# Patient Record
Sex: MALE | Race: WHITE | Employment: FULL TIME | URBAN - METROPOLITAN AREA
[De-identification: names, ages, dates, MRNs, and addresses within clinical notes are randomized per-mention and may not be internally consistent; named-entity substitution may affect disease eponyms.]

---

## 2021-01-01 ENCOUNTER — APPOINTMENT (OUTPATIENT)
Dept: CT IMAGING | Age: 56
End: 2021-01-01
Attending: EMERGENCY MEDICINE
Payer: COMMERCIAL

## 2021-01-01 ENCOUNTER — HOSPITAL ENCOUNTER (EMERGENCY)
Age: 56
End: 2021-07-24
Attending: EMERGENCY MEDICINE | Admitting: EMERGENCY MEDICINE
Payer: COMMERCIAL

## 2021-01-01 VITALS
OXYGEN SATURATION: 79 % | SYSTOLIC BLOOD PRESSURE: 147 MMHG | DIASTOLIC BLOOD PRESSURE: 120 MMHG | WEIGHT: 250.88 LBS | HEART RATE: 59 BPM

## 2021-01-01 DIAGNOSIS — R57.0 CARDIOGENIC SHOCK (HCC): ICD-10-CM

## 2021-01-01 DIAGNOSIS — I46.9 PEA (PULSELESS ELECTRICAL ACTIVITY) (HCC): ICD-10-CM

## 2021-01-01 DIAGNOSIS — I46.9 CARDIAC ARREST (HCC): Primary | ICD-10-CM

## 2021-01-01 DIAGNOSIS — I21.4 NSTEMI (NON-ST ELEVATED MYOCARDIAL INFARCTION) (HCC): ICD-10-CM

## 2021-01-01 LAB
ALBUMIN SERPL-MCNC: 2.4 G/DL (ref 3.5–5)
ALBUMIN/GLOB SERPL: 0.9 {RATIO} (ref 1.1–2.2)
ALP SERPL-CCNC: 94 U/L (ref 45–117)
ALT SERPL-CCNC: 319 U/L (ref 12–78)
ANION GAP SERPL CALC-SCNC: 18 MMOL/L (ref 5–15)
AST SERPL-CCNC: 298 U/L (ref 15–37)
ATRIAL RATE: 166 BPM
ATRIAL RATE: 68 BPM
BASOPHILS # BLD: 0.1 K/UL (ref 0–0.1)
BASOPHILS NFR BLD: 1 % (ref 0–1)
BILIRUB SERPL-MCNC: 0.7 MG/DL (ref 0.2–1)
BUN SERPL-MCNC: 14 MG/DL (ref 6–20)
BUN/CREAT SERPL: 11 (ref 12–20)
CALCIUM SERPL-MCNC: 7.3 MG/DL (ref 8.5–10.1)
CALCULATED P AXIS, ECG09: 78 DEGREES
CALCULATED R AXIS, ECG10: -82 DEGREES
CALCULATED R AXIS, ECG10: 145 DEGREES
CALCULATED T AXIS, ECG11: -57 DEGREES
CALCULATED T AXIS, ECG11: 26 DEGREES
CHLORIDE SERPL-SCNC: 106 MMOL/L (ref 97–108)
CO2 SERPL-SCNC: 24 MMOL/L (ref 21–32)
COMMENT, HOLDF: NORMAL
CREAT SERPL-MCNC: 1.31 MG/DL (ref 0.7–1.3)
DIAGNOSIS, 93000: NORMAL
DIAGNOSIS, 93000: NORMAL
DIFFERENTIAL METHOD BLD: ABNORMAL
EOSINOPHIL # BLD: 0 K/UL (ref 0–0.4)
EOSINOPHIL NFR BLD: 0 % (ref 0–7)
ERYTHROCYTE [DISTWIDTH] IN BLOOD BY AUTOMATED COUNT: 12.4 % (ref 11.5–14.5)
GLOBULIN SER CALC-MCNC: 2.6 G/DL (ref 2–4)
GLUCOSE BLD STRIP.AUTO-MCNC: 272 MG/DL (ref 65–117)
GLUCOSE SERPL-MCNC: 349 MG/DL (ref 65–100)
HCT VFR BLD AUTO: 52.3 % (ref 36.6–50.3)
HGB BLD-MCNC: 16.5 G/DL (ref 12.1–17)
IMM GRANULOCYTES # BLD AUTO: 0 K/UL (ref 0–0.04)
IMM GRANULOCYTES NFR BLD AUTO: 0 % (ref 0–0.5)
LYMPHOCYTES # BLD: 6.8 K/UL (ref 0.8–3.5)
LYMPHOCYTES NFR BLD: 70 % (ref 12–49)
MCH RBC QN AUTO: 32.2 PG (ref 26–34)
MCHC RBC AUTO-ENTMCNC: 31.5 G/DL (ref 30–36.5)
MCV RBC AUTO: 101.9 FL (ref 80–99)
MONOCYTES # BLD: 0.8 K/UL (ref 0–1)
MONOCYTES NFR BLD: 8 % (ref 5–13)
NEUTS BAND NFR BLD MANUAL: 2 %
NEUTS SEG # BLD: 2 K/UL (ref 1.8–8)
NEUTS SEG NFR BLD: 19 % (ref 32–75)
NRBC # BLD: 0.06 K/UL (ref 0–0.01)
NRBC BLD-RTO: 0.6 PER 100 WBC
P-R INTERVAL, ECG05: 130 MS
PLATELET # BLD AUTO: 85 K/UL (ref 150–400)
PMV BLD AUTO: 11.6 FL (ref 8.9–12.9)
POTASSIUM SERPL-SCNC: 3.5 MMOL/L (ref 3.5–5.1)
PROT SERPL-MCNC: 5 G/DL (ref 6.4–8.2)
Q-T INTERVAL, ECG07: 388 MS
Q-T INTERVAL, ECG07: 438 MS
QRS DURATION, ECG06: 134 MS
QRS DURATION, ECG06: 180 MS
QTC CALCULATION (BEZET), ECG08: 440 MS
QTC CALCULATION (BEZET), ECG08: 503 MS
RBC # BLD AUTO: 5.13 M/UL (ref 4.1–5.7)
RBC MORPH BLD: ABNORMAL
SAMPLES BEING HELD,HOLD: NORMAL
SERVICE CMNT-IMP: ABNORMAL
SODIUM SERPL-SCNC: 148 MMOL/L (ref 136–145)
TROPONIN I SERPL-MCNC: 0.15 NG/ML
VENTRICULAR RATE, ECG03: 101 BPM
VENTRICULAR RATE, ECG03: 61 BPM
WBC # BLD AUTO: 9.7 K/UL (ref 4.1–11.1)

## 2021-01-01 PROCEDURE — 2709999900 HC NON-CHARGEABLE SUPPLY

## 2021-01-01 PROCEDURE — 96376 TX/PRO/DX INJ SAME DRUG ADON: CPT

## 2021-01-01 PROCEDURE — 80053 COMPREHEN METABOLIC PANEL: CPT

## 2021-01-01 PROCEDURE — 96374 THER/PROPH/DIAG INJ IV PUSH: CPT

## 2021-01-01 PROCEDURE — 77030015766: Performed by: INTERNAL MEDICINE

## 2021-01-01 PROCEDURE — 99292 CRITICAL CARE ADDL 30 MIN: CPT | Performed by: INTERNAL MEDICINE

## 2021-01-01 PROCEDURE — 31500 INSERT EMERGENCY AIRWAY: CPT

## 2021-01-01 PROCEDURE — 77030029641 HC PMP CARD PERC IMPELLA CP ABIM -L: Performed by: INTERNAL MEDICINE

## 2021-01-01 PROCEDURE — C1894 INTRO/SHEATH, NON-LASER: HCPCS | Performed by: INTERNAL MEDICINE

## 2021-01-01 PROCEDURE — 77030029488 HC ELECTRD PAD DEFIB ZOLL -B

## 2021-01-01 PROCEDURE — 93005 ELECTROCARDIOGRAM TRACING: CPT

## 2021-01-01 PROCEDURE — C1769 GUIDE WIRE: HCPCS | Performed by: INTERNAL MEDICINE

## 2021-01-01 PROCEDURE — 77030005513 HC CATH URETH FOL11 MDII -B

## 2021-01-01 PROCEDURE — 77030019569 HC BND COMPR RAD TERU -B: Performed by: INTERNAL MEDICINE

## 2021-01-01 PROCEDURE — 92950 HEART/LUNG RESUSCITATION CPR: CPT

## 2021-01-01 PROCEDURE — 84484 ASSAY OF TROPONIN QUANT: CPT

## 2021-01-01 PROCEDURE — 77030008683 HC TU ET CUF COVD -A

## 2021-01-01 PROCEDURE — 96375 TX/PRO/DX INJ NEW DRUG ADDON: CPT

## 2021-01-01 PROCEDURE — 99291 CRITICAL CARE FIRST HOUR: CPT | Performed by: INTERNAL MEDICINE

## 2021-01-01 PROCEDURE — 77030002996 HC SUT SLK J&J -A: Performed by: INTERNAL MEDICINE

## 2021-01-01 PROCEDURE — 93452 LEFT HRT CATH W/VENTRCLGRPHY: CPT | Performed by: INTERNAL MEDICINE

## 2021-01-01 PROCEDURE — 74011000250 HC RX REV CODE- 250: Performed by: INTERNAL MEDICINE

## 2021-01-01 PROCEDURE — 77030019698 HC SYR ANGI MDLON MRTM -A: Performed by: INTERNAL MEDICINE

## 2021-01-01 PROCEDURE — 33999 UNLISTED PX CARDIAC SURGERY: CPT | Performed by: INTERNAL MEDICINE

## 2021-01-01 PROCEDURE — 99153 MOD SED SAME PHYS/QHP EA: CPT | Performed by: INTERNAL MEDICINE

## 2021-01-01 PROCEDURE — 99152 MOD SED SAME PHYS/QHP 5/>YRS: CPT | Performed by: INTERNAL MEDICINE

## 2021-01-01 PROCEDURE — C1887 CATHETER, GUIDING: HCPCS | Performed by: INTERNAL MEDICINE

## 2021-01-01 PROCEDURE — 51702 INSERT TEMP BLADDER CATH: CPT

## 2021-01-01 PROCEDURE — 84295 ASSAY OF SERUM SODIUM: CPT

## 2021-01-01 PROCEDURE — 77030008543 HC TBNG MON PRSS MRTM -A: Performed by: INTERNAL MEDICINE

## 2021-01-01 PROCEDURE — 77030004549 HC CATH ANGI DX PRF MRTM -A: Performed by: INTERNAL MEDICINE

## 2021-01-01 PROCEDURE — 96365 THER/PROPH/DIAG IV INF INIT: CPT

## 2021-01-01 PROCEDURE — 71275 CT ANGIOGRAPHY CHEST: CPT

## 2021-01-01 PROCEDURE — 74011250636 HC RX REV CODE- 250/636: Performed by: EMERGENCY MEDICINE

## 2021-01-01 PROCEDURE — 74011000250 HC RX REV CODE- 250: Performed by: EMERGENCY MEDICINE

## 2021-01-01 PROCEDURE — 36556 INSERT NON-TUNNEL CV CATH: CPT | Performed by: INTERNAL MEDICINE

## 2021-01-01 PROCEDURE — 36415 COLL VENOUS BLD VENIPUNCTURE: CPT

## 2021-01-01 PROCEDURE — 94002 VENT MGMT INPAT INIT DAY: CPT

## 2021-01-01 PROCEDURE — 77030010221 HC SPLNT WR POS TELE -B: Performed by: INTERNAL MEDICINE

## 2021-01-01 PROCEDURE — 82962 GLUCOSE BLOOD TEST: CPT

## 2021-01-01 PROCEDURE — 99284 EMERGENCY DEPT VISIT MOD MDM: CPT | Performed by: INTERNAL MEDICINE

## 2021-01-01 PROCEDURE — 99291 CRITICAL CARE FIRST HOUR: CPT

## 2021-01-01 PROCEDURE — 77030008771 HC TU NG SALEM SUMP -A

## 2021-01-01 PROCEDURE — 74011000636 HC RX REV CODE- 636: Performed by: EMERGENCY MEDICINE

## 2021-01-01 PROCEDURE — 85025 COMPLETE CBC W/AUTO DIFF WBC: CPT

## 2021-01-01 PROCEDURE — 77010033678 HC OXYGEN DAILY

## 2021-01-01 PROCEDURE — 74011250636 HC RX REV CODE- 250/636: Performed by: INTERNAL MEDICINE

## 2021-01-01 PROCEDURE — 77030019697 HC SYR ANGI INFL MRTM -B: Performed by: INTERNAL MEDICINE

## 2021-01-01 RX ORDER — SODIUM BICARBONATE 1 MEQ/ML
SYRINGE (ML) INTRAVENOUS
Status: COMPLETED | OUTPATIENT
Start: 2021-01-01 | End: 2021-01-01

## 2021-01-01 RX ORDER — EPINEPHRINE 0.1 MG/ML
INJECTION INTRACARDIAC; INTRAVENOUS
Status: COMPLETED | OUTPATIENT
Start: 2021-01-01 | End: 2021-01-01

## 2021-01-01 RX ORDER — LIDOCAINE HCL/PF 100 MG/5ML
SYRINGE (ML) INTRAVENOUS
Status: COMPLETED | OUTPATIENT
Start: 2021-01-01 | End: 2021-01-01

## 2021-01-01 RX ORDER — CALCIUM GLUCONATE 94 MG/ML
INJECTION, SOLUTION INTRAVENOUS AS NEEDED
Status: DISCONTINUED | OUTPATIENT
Start: 2021-01-01 | End: 2021-01-01 | Stop reason: HOSPADM

## 2021-01-01 RX ORDER — LIDOCAINE HYDROCHLORIDE 10 MG/ML
INJECTION INFILTRATION; PERINEURAL AS NEEDED
Status: DISCONTINUED | OUTPATIENT
Start: 2021-01-01 | End: 2021-01-01 | Stop reason: HOSPADM

## 2021-01-01 RX ORDER — NOREPINEPHRINE BITARTRATE/D5W 8 MG/250ML
PLASTIC BAG, INJECTION (ML) INTRAVENOUS
Status: COMPLETED | OUTPATIENT
Start: 2021-01-01 | End: 2021-01-01

## 2021-01-01 RX ORDER — PHENYLEPHRINE HCL IN 0.9% NACL 0.4MG/10ML
SYRINGE (ML) INTRAVENOUS
Status: DISCONTINUED
Start: 2021-01-01 | End: 2021-01-01 | Stop reason: HOSPADM

## 2021-01-01 RX ORDER — HEPARIN SODIUM 5000 [USP'U]/ML
INJECTION, SOLUTION INTRAVENOUS; SUBCUTANEOUS
Status: COMPLETED | OUTPATIENT
Start: 2021-01-01 | End: 2021-01-01

## 2021-01-01 RX ORDER — EPINEPHRINE 1 MG/ML
INJECTION, SOLUTION, CONCENTRATE INTRAVENOUS
Status: DISCONTINUED
Start: 2021-01-01 | End: 2021-01-01 | Stop reason: HOSPADM

## 2021-01-01 RX ORDER — PHENYLEPHRINE HYDROCHLORIDE 10 MG/ML
INJECTION INTRAVENOUS AS NEEDED
Status: DISCONTINUED | OUTPATIENT
Start: 2021-01-01 | End: 2021-01-01 | Stop reason: HOSPADM

## 2021-01-01 RX ORDER — CALCIUM CHLORIDE INJECTION 100 MG/ML
INJECTION, SOLUTION INTRAVENOUS
Status: COMPLETED | OUTPATIENT
Start: 2021-01-01 | End: 2021-01-01

## 2021-01-01 RX ORDER — ATROPINE SULFATE 0.1 MG/ML
INJECTION INTRAVENOUS
Status: COMPLETED | OUTPATIENT
Start: 2021-01-01 | End: 2021-01-01

## 2021-01-01 RX ORDER — AMIODARONE HYDROCHLORIDE 150 MG/3ML
INJECTION, SOLUTION INTRAVENOUS
Status: COMPLETED | OUTPATIENT
Start: 2021-01-01 | End: 2021-01-01

## 2021-01-01 RX ORDER — DIPHENHYDRAMINE HYDROCHLORIDE 50 MG/ML
50 INJECTION, SOLUTION INTRAMUSCULAR; INTRAVENOUS
Status: DISCONTINUED | OUTPATIENT
Start: 2021-01-01 | End: 2021-01-01

## 2021-01-01 RX ORDER — ASPIRIN 300 MG/1
300 SUPPOSITORY RECTAL
Status: DISCONTINUED | OUTPATIENT
Start: 2021-01-01 | End: 2021-01-01 | Stop reason: HOSPADM

## 2021-01-01 RX ADMIN — EPINEPHRINE 1 MG: 0.1 INJECTION, SOLUTION ENDOTRACHEAL; INTRACARDIAC; INTRAVENOUS at 01:18

## 2021-01-01 RX ADMIN — IOPAMIDOL 100 ML: 755 INJECTION, SOLUTION INTRAVENOUS at 01:44

## 2021-01-01 RX ADMIN — CALCIUM CHLORIDE 1 G: 100 INJECTION, SOLUTION INTRAVENOUS at 01:28

## 2021-01-01 RX ADMIN — SODIUM CHLORIDE, POTASSIUM CHLORIDE, SODIUM LACTATE AND CALCIUM CHLORIDE 1000 ML: 600; 310; 30; 20 INJECTION, SOLUTION INTRAVENOUS at 01:31

## 2021-01-01 RX ADMIN — EPINEPHRINE 1 MG: 0.1 INJECTION, SOLUTION ENDOTRACHEAL; INTRACARDIAC; INTRAVENOUS at 02:07

## 2021-01-01 RX ADMIN — HEPARIN SODIUM 4000 UNITS: 5000 INJECTION, SOLUTION INTRAVENOUS; SUBCUTANEOUS at 02:21

## 2021-01-01 RX ADMIN — EPINEPHRINE 1 MG: 0.1 INJECTION, SOLUTION ENDOTRACHEAL; INTRACARDIAC; INTRAVENOUS at 02:10

## 2021-01-01 RX ADMIN — SODIUM BICARBONATE 50 MEQ: 84 INJECTION, SOLUTION INTRAVENOUS at 01:57

## 2021-01-01 RX ADMIN — AMIODARONE HYDROCHLORIDE 300 MG: 50 INJECTION, SOLUTION INTRAVENOUS at 01:24

## 2021-01-01 RX ADMIN — ATROPINE SULFATE 1 MG: 0.1 INJECTION, SOLUTION ENDOTRACHEAL; INTRAMUSCULAR; INTRAVENOUS; SUBCUTANEOUS at 01:52

## 2021-01-01 RX ADMIN — EPINEPHRINE 1 MG: 0.1 INJECTION, SOLUTION ENDOTRACHEAL; INTRACARDIAC; INTRAVENOUS at 01:33

## 2021-01-01 RX ADMIN — CALCIUM CHLORIDE 1 G: 100 INJECTION, SOLUTION INTRAVENOUS at 01:58

## 2021-01-01 RX ADMIN — EPINEPHRINE 1 MG: 0.1 INJECTION, SOLUTION ENDOTRACHEAL; INTRACARDIAC; INTRAVENOUS at 01:31

## 2021-01-01 RX ADMIN — SODIUM BICARBONATE 50 MEQ: 84 INJECTION, SOLUTION INTRAVENOUS at 01:18

## 2021-01-01 RX ADMIN — EPINEPHRINE 1 MG: 0.1 INJECTION, SOLUTION ENDOTRACHEAL; INTRACARDIAC; INTRAVENOUS at 01:21

## 2021-01-01 RX ADMIN — EPINEPHRINE 1 MG: 0.1 INJECTION, SOLUTION ENDOTRACHEAL; INTRACARDIAC; INTRAVENOUS at 01:51

## 2021-01-01 RX ADMIN — EPINEPHRINE 1 MG: 0.1 INJECTION, SOLUTION ENDOTRACHEAL; INTRACARDIAC; INTRAVENOUS at 01:27

## 2021-01-01 RX ADMIN — EPINEPHRINE 1 MG: 0.1 INJECTION, SOLUTION ENDOTRACHEAL; INTRACARDIAC; INTRAVENOUS at 01:17

## 2021-01-01 RX ADMIN — EPINEPHRINE 2 MCG/MIN: 1 INJECTION INTRAMUSCULAR; INTRAVENOUS; SUBCUTANEOUS at 01:34

## 2021-01-01 RX ADMIN — LIDOCAINE HYDROCHLORIDE 100 MG: 20 INJECTION, SOLUTION INTRAVENOUS at 01:27

## 2021-01-01 RX ADMIN — SODIUM BICARBONATE 50 MEQ: 84 INJECTION, SOLUTION INTRAVENOUS at 01:28

## 2021-01-01 RX ADMIN — EPINEPHRINE 1 MG: 0.1 INJECTION, SOLUTION ENDOTRACHEAL; INTRACARDIAC; INTRAVENOUS at 01:23

## 2021-01-01 RX ADMIN — EPINEPHRINE 1 MG: 0.1 INJECTION, SOLUTION ENDOTRACHEAL; INTRACARDIAC; INTRAVENOUS at 02:23

## 2021-07-24 PROBLEM — I46.9 CARDIAC ARREST (HCC): Status: ACTIVE | Noted: 2021-01-01

## 2021-07-24 PROBLEM — R57.0 CARDIOGENIC SHOCK (HCC): Status: ACTIVE | Noted: 2021-01-01

## 2021-07-24 PROBLEM — I46.9 PEA (PULSELESS ELECTRICAL ACTIVITY) (HCC): Status: ACTIVE | Noted: 2021-01-01

## 2021-07-24 NOTE — PROGRESS NOTES
responded to death notification of MR. Isabel George in 2552. Family was gathered in the room, was in debrief with doctor. After the doctor's meeting,  provided bereavement ministry to them with grief support and affirming their family support for Mr. Isabel George. They remained in the room, shared each other about the patients,  gave them time and space for grief process. Advised of  availability.      [id number] [facility] Bipin Mendez., M.S., Th.M.  Spiritual Care Provider   Paging Service [phone])

## 2021-07-24 NOTE — ED NOTES
0115:Patient arrives via EMS for cardiac arrest, CPR ongoing  0120: Shock performed per Erik Young MD  0123: Shock performed per Erik Young MD  0553: Shock performed per Erik Young MD  7793: pulse obtained  0143: Patient to CT  0158: Patient back to ED 20  NG tube (0201) & Doan (8217) placed by Fallon Romano with assistance from Maegan Blake (tech)  6693:  Laura Maki MD at bedside  0228: Patient to Cath lab

## 2021-07-24 NOTE — ACP (ADVANCE CARE PLANNING)
48 y/o M pt presented to [id number] [place] [address] after witnessed OSH cardiac arrest. Over the next 1.5 hours pt had multiple cardiac arrests with brief periods of ROSC. [address] conversation held with patients family, including two brothers Rey Dorsey and Heriberto Thorpe. I informed them that pt had had multiple cardiac arrests since he arrived 1-2 hours prior, and he at the time was getting CPR. Explained that given patient's prolonged down time even if we were to get a pulse back again, chance of meaningful recovery and return to cognitive baseline is unlikely. Pt's family stated that we should stop CPR and no longer try to resusitate him. I informed cath lab staff of families decision and CPR was stopped.     Miquel Gomez NP  [facility] critical care  7/23/21 4:10am

## 2021-07-24 NOTE — ED PROVIDER NOTES
EMERGENCY DEPARTMENT HISTORY AND PHYSICAL EXAM     ------------------------------------------------------------------------------------------------------  Please note that this dictation was completed with [employer], the [employer] voice recognition software. Quite often unanticipated grammatical, syntax, homophones, and other interpretive errors are inadvertently transcribed by the computer software. Please disregard these errors. Please excuse any errors that have escaped final proofreading.  -----------------------------------------------------------------------------------------------------------------    Date: 7/24/2021  Patient Name: Mandi Bauman    History of Presenting Illness     Chief Complaint   Patient presents with    Cardiac arrest       History Provided By: EMS    HPI: Mandi Bauman is a 49 y.o. male, without known significant pmhx (although family noted significant family history of coronary disease with multiple family members having CABGs), who presents via EMS to the ED with report of witnessed arrest by patient's teenager son who called 48 and initiated bystander CPR. EMS notes that on their arrival patient was in cardiac arrest.  CPR was initiated and apnea was given. Patient had eye gel placed by EMS and was ventilated. EMS notes several rounds of epinephrine through left lower extremity IO and reactive fibrillation for V. fib/V. tach arrest with intermittent PEA. On arrival patient is cyanotic from nipple line up, injected sclera bilaterally and frothy sputum coming from igel tube. We assumed care of patient in treatment room and continue with ACLS protocol, placing patient on [facility] device for CPR, reinitiation of epinephrine infusions.     PCP: Unknown, Provider, MD      Not on File      Current Facility-Administered Medications   Medication Dose Route Frequency Provider Last Rate Last Admin    EPINEPHrine HCl (PF) (ADRENALIN) 1 mg/mL (1 mL) injection             EPINEPHrine (ADRENALIN) 5 mg in 0.9% sodium chloride 250 mL infusion  0-10 mcg/min IntraVENous TITRATE Karena Quach MD 60 mL/hr at 07/24/21 0318 20 mcg/min at 07/24/21 0318    PHENYLephrine (NEOSYNEPHRINE) 0.4 mg/10 mL (40 mcg/mL) in NS syringe             aspirin (ASA) suppository 300 mg  300 mg Rectal NOW Karena Quach MD           Past History     Past Medical History:  No past medical history on file. Past Surgical History:  No past surgical history on file. Family History:  No family history on file. Social History:  Social History     Tobacco Use    Smoking status: Not on file   Substance Use Topics    Alcohol use: Not on file    Drug use: Not on file       Allergies:  Not on File      Review of Systems   Review of Systems   Unable to perform ROS: Acuity of condition       Physical Exam   Physical Exam  Vitals and nursing note reviewed. Constitutional:       Appearance: He is ill-appearing. Interventions: He is intubated. HENT:      Mouth/Throat:      Mouth: Mucous membranes are moist.      Comments: Copious amounts of bloody frothy sputum noted on intubation  Eyes:      Comments: 2+4 mm, fixed and dilated   Cardiovascular:      Pulses:           Carotid pulses are 0 on the right side. Femoral pulses are 0 on the right side. Pulmonary:      Effort: He is intubated. Comments: Agonal respirations  Abdominal:      General: There is distension. Palpations: Abdomen is soft. Genitourinary:     Penis: Normal.    Musculoskeletal:      Cervical back: Neck supple. Skin:     Capillary Refill: Capillary refill takes more than 3 seconds. Coloration: Skin is cyanotic (Cyanotic from chest to face). Neurological:      Mental Status: He is unresponsive. GCS: GCS eye subscore is 1. GCS verbal subscore is 1. GCS motor subscore is 1.            Diagnostic Study Results     Labs -     Recent Results (from the past 12 hour(s))   GLUCOSE, POC    Collection Time: 07/24/21  1:19 AM   Result Value Ref Range    Glucose (POC) 272 (H) 65 - 117 mg/dL    Performed by Molly DAVID)    CBC WITH AUTOMATED DIFF    Collection Time: 07/24/21  1:28 AM   Result Value Ref Range    WBC 9.7 4.1 - 11.1 K/uL    RBC 5.13 4.10 - 5.70 M/uL    HGB 16.5 12.1 - 17.0 g/dL    HCT 52.3 (H) 36.6 - 50.3 %    .9 (H) 80.0 - 99.0 FL    MCH 32.2 26.0 - 34.0 PG    MCHC 31.5 30.0 - 36.5 g/dL    RDW 12.4 11.5 - 14.5 %    PLATELET 85 (L) 854 - 400 K/uL    MPV 11.6 8.9 - 12.9 FL    NRBC 0.6 (H) 0  WBC    ABSOLUTE NRBC 0.06 (H) 0.00 - 0.01 K/uL    NEUTROPHILS 19 (L) 32 - 75 %    BAND NEUTROPHILS 2 %    LYMPHOCYTES 70 (H) 12 - 49 %    MONOCYTES 8 5 - 13 %    EOSINOPHILS 0 0 - 7 %    BASOPHILS 1 0 - 1 %    IMMATURE GRANULOCYTES 0 0.0 - 0.5 %    ABS. NEUTROPHILS 2.0 1.8 - 8.0 K/UL    ABS. LYMPHOCYTES 6.8 (H) 0.8 - 3.5 K/UL    ABS. MONOCYTES 0.8 0.0 - 1.0 K/UL    ABS. EOSINOPHILS 0.0 0.0 - 0.4 K/UL    ABS. BASOPHILS 0.1 0.0 - 0.1 K/UL    ABS. IMM. GRANS. 0.0 0.00 - 0.04 K/UL    DF AUTOMATED      RBC COMMENTS MACROCYTOSIS  1+       TROPONIN I    Collection Time: 07/24/21  1:28 AM   Result Value Ref Range    Troponin-I, Qt. 0.15 (H) <8.25 ng/mL   METABOLIC PANEL, COMPREHENSIVE    Collection Time: 07/24/21  1:28 AM   Result Value Ref Range    Sodium 148 (H) 136 - 145 mmol/L    Potassium 3.5 3.5 - 5.1 mmol/L    Chloride 106 97 - 108 mmol/L    CO2 24 21 - 32 mmol/L    Anion gap 18 (H) 5 - 15 mmol/L    Glucose 349 (H) 65 - 100 mg/dL    BUN 14 6 - 20 MG/DL    Creatinine 1.31 (H) 0.70 - 1.30 MG/DL    BUN/Creatinine ratio 11 (L) 12 - 20      GFR est AA >60 >60 ml/min/1.73m2    GFR est non-AA 57 (L) >60 ml/min/1.73m2    Calcium 7.3 (L) 8.5 - 10.1 MG/DL    Bilirubin, total 0.7 0.2 - 1.0 MG/DL    ALT (SGPT) 319 (H) 12 - 78 U/L    AST (SGOT) 298 (H) 15 - 37 U/L    Alk.  phosphatase 94 45 - 117 U/L    Protein, total 5.0 (L) 6.4 - 8.2 g/dL    Albumin 2.4 (L) 3.5 - 5.0 g/dL    Globulin 2.6 2.0 - 4.0 g/dL    A-G Ratio 0.9 (L) 1.1 - 2. 2     SAMPLES BEING HELD    Collection Time: 07/24/21  1:28 AM   Result Value Ref Range    SAMPLES BEING HELD LEONARD,RED,SST     COMMENT        Add-on orders for these samples will be processed based on acceptable specimen integrity and analyte stability, which may vary by analyte. EKG, 12 LEAD, INITIAL    Collection Time: 07/24/21  1:37 AM   Result Value Ref Range    Ventricular Rate 61 BPM    Atrial Rate 68 BPM    QRS Duration 180 ms    Q-T Interval 438 ms    QTC Calculation (Bezet) 440 ms    Calculated R Axis -82 degrees    Calculated T Axis 26 degrees    Diagnosis       Wide QRS rhythm  Left axis deviation  Right bundle branch block  Cannot rule out Inferior infarct , age undetermined  Anterolateral infarct , possibly acute  ** ** ACUTE MI / STEMI ** **  No previous ECGs available         Radiologic Studies -   CTA CHEST W OR W WO CONT   Final Result      1. Limitations as above. No large pulmonary embolus is identified. 2.  Extensive bilateral diffuse airspace disease. Differential diagnosis   includes aspiration, hemorrhage, or multifocal pneumonia           CT Results  (Last 48 hours)               07/24/21 0205  CTA CHEST W OR W WO CONT Final result    Impression:      1. Limitations as above. No large pulmonary embolus is identified. 2.  Extensive bilateral diffuse airspace disease. Differential diagnosis   includes aspiration, hemorrhage, or multifocal pneumonia           Narrative:  EXAM:  CTA CHEST W OR W WO CONT       INDICATION: Evaluate for PE       COMPARISON: None. TECHNIQUE: Helical thin section chest CT following uneventful intravenous   administration of nonionic contrast 100 mL of isovue 370 according to   departmental PE protocol. Coronal and sagittal reformats were performed. 3D post   processing was performed. CT dose reduction was achieved through the use of a   standardized protocol tailored for this examination and automatic exposure   control for dose modulation. FINDINGS: This is a limited quality study for the evaluation of pulmonary   embolism to the proximal segmental arterial level. Streak artifact from the   contrast bolus also limits evaluation in the right upper lobe. No large   pulmonary embolus is identified. THYROID: No nodule. MEDIASTINUM: Intubated   URVASHI: No mass or lymphadenopathy. THORACIC AORTA: No aneurysm. HEART: Normal in size. ESOPHAGUS: No wall thickening or dilatation. TRACHEA/BRONCHI: Patent. PLEURA: No effusion or pneumothorax. LUNGS: Extensive diffuse bilateral airspace disease. UPPER ABDOMEN: Doan noncontrast in the right hepatic lobe and IVC. BONES: No aggressive bone lesion or fracture. CXR Results  (Last 48 hours)    None            Medical Decision Making   I am the first provider for this patient. I reviewed the vital signs, available nursing notes, past medical history, past surgical history, family history and social history. Vital Signs-Reviewed the patient's vital signs. Patient Vitals for the past 12 hrs:   Pulse BP SpO2   07/24/21 0226 (!) 59     07/24/21 0211   (!) 79 %   07/24/21 0207  (!) 147/120    07/24/21 0151  (!) 118/95    07/24/21 0141 (!) 55     07/24/21 0140 67     07/24/21 0136   98 %   07/24/21 0135 65  (!) 73 %   07/24/21 0133  (!) 102/27    07/24/21 0130   (!) 71 %   07/24/21 0127   (!) 80 %       Pulse Oximetry Analysis - 79% on 100% O2 by I-gel and with intubation    Records Reviewed/Interpretted: Nursing Notes from triage and Old Medical Records, noting remote visit for minor medical concern    Provider Notes (Medical Decision Making):     DDX:  Massive PE, MI, malignant arrhythmia    Plan:  Intubation, ACLS protocols, EKG, CTA, code shock called, STEMI called    Impression:  Cardiac arrest, malignant arrhythmia    ED Course:   Initial assessment performed.  The patients presenting problems have been discussed, and they are in agreement with the care plan formulated and outlined with them. I have encouraged them to ask questions as they arise throughout their visit. I reviewed our electronic medical record system for any past medical records that were available that may contribute to the patients current condition, the nursing notes and and vital signs from today's visit  Nursing notes will be reviewed as they become available in realtime while the pt has been in the ED. Rio Myles MD      I personally reviewed/interpreted pt's imaging. Agree with official read by radiology as noted above. Rio Myles MD    Procedure Note - Intubation: 0272  Performed by Rio Myles MD .     Immediately prior to the procedure, the patient was reevaluated and found suitable for the planned procedure and any planned medications. Immediately prior to the procedure a time out was called to verify the correct patient, procedure, equipment, staff, and marking as appropriate. A number 7.5 cuffed   ETT was placed to 21 cm at the teeth. Placement was evaluated by noting bilateral, symmetric breath sounds, good end-tidal CO2 detector color change , no breath sounds over stomach and bulb aspirator expands promptly. Attempts required: 1. I-gel tube placed by EMS was room just prior to intubation. Patient with copious amounts of bloody frothy sputum coming out of ET tube with immediate intubation, patient suctioned by respiratory therapy. Noted to have persistently low oxygen readings with poor Pleth morphology. The procedure was tolerated well. Procedure Note - CPR Efforts:   I supervised and directed all CPR efforts. Procedure Time: 30 minutes (collectively)    CONSULT NOTE:   Rio Myles MD spoke with Dr. Luc Wallace,   Specialty: Cardiology  Consulted Dr. Luc Wallace due to cardiac arrest and ROSC with STEMI on EKG.   Discussed pt's HPI and available diagnostic results thus far, specifically noting EKG sent by perfect serve and unstable nature of patient's current clinical course. . Expressed concerns for need of Cath Lab intervention  Consultant recommends aspirin and 4000 units of heparin, and route to assist with patient care. Justo Leon MD    ICU CONSULT NOTE:   Justo Leon MD spoke with NP Gustabo Armaan,   Specialty: [address] NP due to patient and recurrent cardiac arrest with multiple rounds of ROSC, currently in Cath Lab with intent for diagnostic/interventional cath. Discussed pt's HPI and available diagnostic results thus are noting no evidence of PE on CTA, concern for obstructive lesion. Derrelldanuta Joyner will assume care of patients code in the Cath Lab and further discussed with patient's family as needed  Justo Leon MD      Progress Note:  Patient underwent extensive, heroic measures and attempt to stabilize his cardiac arrhythmias and sustain ROSC. Collectively patient received 14 epinephrines with epi drip initiated, amiodarone, lidocaine, bicarb infusions with drip initiated multiple other modalities of pharmacology and attempt to stabilize cardiac membranes and sustain a pulse. Upon ROSC, EKG was performed noting concern for malignant wide-complex arrhythmia with potential for ischemia but with strong suspicion and concern for saddle PE. Patient was stabilized long enough to be transported to CT for CTA and further delineation of PE versus MI. Contacted cardiology due to my concerns for saddle PE versus MI. Cath Lab initiated with potential for code shock. Patient noted to bradycardia down while in CT scanner and was given boluses of epinephrine, calcium and bicarb. Was subsequently transitioned back to his treatment room where I contacted radiologist and attempt to have CTA read performed as promptly as possible. Dr. Justo Leon was able to read images in real-time and noted no large saddle PE noted on CTA. These results were relayed to Dr. Sue Alejandra who was enroute to evaluate patient personally. Repeat EKG showed concerning signs of potential STEMI. Upon arrival to the emergency department Dr. Andree Cintron agrees that due to patient's unstable cardiac rhythms, recurrent coding and then ROSC, the best chance of patient survival would be for emergent catheterization with evaluation for coronary occlusion. Throughout this time I had multiple meetings with patient's family members noting his critical, tenuous status and plan for Cath Lab evaluation intervention by cardiology. Due to patient's tenuous status Dr. Andree Cintron was unable to speak with family directly prior to taking the patient to the Cath Lab but I relayed to him that the family was in agreement for this emergent procedure as a heroic measure that might save his life. After arriving to the Cath Lab patient again lost his pulse and CODE BLUE was called. I arrived to the Cath Lab for assistance for ACLS protocols were continued as well as CPR. Nurse practitioner Jay Bruner arrived to the Cath Lab at which point I provided her with a rundown of all of the events from this evening and she assumed care of patient in addition to Dr. Antoine Hinojosa and Cath Lab.                Critical Care Time:       CRITICAL CARE NOTE  IMPENDING DETERIORATION -Airway, Respiratory, Cardiovascular, CNS, Metabolic, Renal and Hepatic  ASSOCIATED RISK FACTORS - Hypotension, Shock, Hypoxia, Bleeding, Trauma, Dysrhythmia, Metabolic changes, Dehydration, Vascular Compromise and CNS Decompensation  MANAGEMENT- Bedside Assessment and Supervision of Care  INTERPRETATION -  Xrays, CT Scan, Blood Gases, ECG, Blood Pressure, Cardiac Output Measures  and Screening Ultrasound  INTERVENTIONS - hemodynamic mngmt, vent mngmt, defibrillation, gastric tube, vascular control, Neurologic interventions  and Metobolic interventions  CASE REVIEW - Hospitalist/Intensivist, Medical Sub-Specialist, Nursing and Family  TREATMENT RESPONSE -Improved  PERFORMED BY - Self  NOTES   :  I have spent 60 minutes of critical care time involved in lab review, consultations with specialist, family decision- making, bedside attention and documentation excluding time spent on any separately billed procedures. During this entire length of time I was immediately available to the patient . Joyce Novoa MD      Diagnosis     Clinical Impression:   1. Cardiac arrest ([address]    2. NSTEMI (non-ST elevated myocardial infarction) ([address]    3. Cardiogenic shock ([address]    4. PEA (Pulseless electrical activity) ([address]        PLAN:  1.   Care transition to cardiology services in route to the Cath Lab for emergent diagnostic cath and possible intervention

## 2021-07-24 NOTE — CONSULTS
CARDIOLOGY H&P       Date of  Admission: 7/24/2021  1:18 AM     Admission type:Emergency    Consult for:  Cardiac arrest, PEA  Consulted by:  Dr. Td Martínez     Subjective:     Timothy Murguia is a 49 y.o. male admitted for PEA arrest with CPR in progress when brought in by EMS. Initial EKG after return of spontaneous circulation revealed a wide complex rhythm, likely accelerated idioventricular rhythm without any diagnostic ST elevation. At this point the patient had already required multiple rounds of epinephrine and sodium bicarbonate, and suspicion was for massive pulmonary embolism. The patient was brought to CT scan which was reportedly of suboptimal quality, but without any diagnostic pulmonary embolism: \"IMPRESSION     1.  Limitations as above. No large pulmonary embolus is identified.     2. Extensive bilateral diffuse airspace disease. Differential diagnosis  includes aspiration, hemorrhage, or multifocal pneumonia\"    The patient was unstable, and shortly after return from CTA became pulseless again requiring intermittent CPR. Patient was placed on an epinephrine drip, as well as Levophed drip. As CTA was negative for pulmonary embolism, and feeling like the next most likely cause of the sudden cardiac arrest was myocardial infarction, when we were finally able to get an adequate femoral pulse, emergent cardiac catheterization plus or minus Impella plus or minus PCI was discussed with the patient's family who expressed understanding and wished to proceed. The patient was brought emergently to the Cath Lab, and lost his pulse several times prior to draping requiring more CPR, and escalating doses of pressors. When we were finally able to maintain a femoral pulse for greater than 1 minute, we proceeded with plan for emergent Impella placement, with plan for subsequent coronary angiography and PCI if a culprit lesion was found.   Initial femoral arterial blood pressure was approximately 60 systolic, which was felt to be incompatible with maintaining adequate cerebral perfusion, so CPR/ ACLS was resumed as we initiated the steps to place an Impella. .  At this point the patient had been coding for greater than 1 hour and ICU nurse practitioner Toya Gordon explained the patient's extremely grave condition and the likelihood that the patient could die no matter what we did. At that point, the patient's family requested that we cease resuscitative efforts. Just after femoral venous access and then femoral arterial access with Impella sheath placement and crossing into the ventricle with a Adam right 4 catheter, and after family had made this decision, the patient went into ventricular fibrillation with no pulse. Resuscitative efforts had gone on for well over 1 hour and at that point the patient's pupils were fixed and dilated, and we honored the family's request not to proceed with any more resuscitative efforts. Coronary angiography was not able to be obtained prior to the patient's passing. Patient Active Problem List    Diagnosis Date Noted    Cardiac arrest ([facility] [address] 07/24/2021    PEA (Pulseless electrical activity) ([facility] [address] 07/24/2021    Cardiogenic shock ([facility] [address] 07/24/2021      Unknown, Provider, MD  No past medical history on file. Social History     Socioeconomic History    Marital status:      Spouse name: Not on file    Number of children: Not on file    Years of education: Not on file    Highest education level: Not on file     Social Determinants of Health     Financial Resource Strain:     Difficulty of Paying Living Expenses:    Food Insecurity:     Worried About Running Out of Food in the Last Year:     [address] in the Last Year:    Transportation Needs:     Lack of Transportation (Medical):      Lack of Transportation (Non-Medical):    Physical Activity:     Days of Exercise per Week:     Minutes of Exercise per Session:    Stress:     Feeling of Stress :    Social Connections:     Frequency of Communication with Friends and Family:     Frequency of Social Gatherings with Friends and Family:     Attends [employer] Services:     Active Member of Clubs or Organizations:     Attends Club or Organization Meetings:     Marital Status:      Not on File   No family history on file. Current Facility-Administered Medications   Medication Dose Route Frequency    EPINEPHrine HCl (PF) (ADRENALIN) 1 mg/mL (1 mL) injection        EPINEPHrine (ADRENALIN) 5 mg in 0.9% sodium chloride 250 mL infusion  0-10 mcg/min IntraVENous TITRATE    PHENYLephrine (NEOSYNEPHRINE) 0.4 mg/10 mL (40 mcg/mL) in NS syringe        aspirin (ASA) suppository 300 mg  300 mg Rectal NOW    NOREPINephrine (LEVOPHED) 8 mg in 5% dextrose 250mL (32 mcg/mL) infusion    CONTINUOUS    calcium gluconate injection    PRN    lidocaine (XYLOCAINE) 10 mg/mL (1 %) injection    PRN    EPINEPHrine (ADRENALIN) 4,000 mcg in 0.9% sodium chloride 250 mL infusion    CONTINUOUS    PHENYLephrine (GRACE-SYNEPHRINE) injection    PRN         Review of Symptoms:  Unable to be obtained as the patient was sedated and intubated     Subjective:        Visit Vitals  BP (!) 147/120   Pulse (!) 59   Wt 250 lb 14.1 oz (113.8 kg)   SpO2 (!) 79%       Physical:  Gen: Sedated and intubated, with agonal respirations over the ventilator, patient ashen  Heart: regular rhythm, no murmur, gallop or rub  Lungs: Coarse with diminished sounds bilaterally  Neck: supple, symmetrical, trachea midline, no adenopathy, thyroid: not enlarged, symmetric, no tenderness/mass/nodules, no carotid bruit and no JVD  Abdomen: soft, non-tender.  Bowel sounds normal. No masses,  no organomegaly  Extremities: extremities normal, atraumatic, no cyanosis or edema, intermittent positive femorals without bruits between rounds of CPR, normal dp pulses when pulse was present  Neurologic: Sedated and intubated, with pupils fixed and dilated but shortly after intubation medications    Data Review:   Recent Labs     07/24/21  0128   WBC 9.7   HGB 16.5   HCT 52.3*   PLT 85*     Recent Labs     07/24/21  0128   *   K 3.5      CO2 24   *   BUN 14   CREA 1.31*   CA 7.3*   ALB 2.4*   TBILI 0.7   *       Recent Labs     07/24/21  0128   TROIQ 0.15*       No results found for: CHOL, CHOLPOCT, CHOLX, CHLST, CHOLV, HDL, HDLPOC, HDLP, LDL, LDLCPOC, LDLC, DLDLP, VLDLC, VLDL, TGLX, TRIGL, TRIGP, TGLPOCT, CHHD, CHHDX      No intake or output data in the 24 hours ending 07/24/21 0344     Cardiographics    Telemetry: Wide-complex rhythm       Assessment:       Principal Problem:    Cardiac arrest ([facility] [address] (7/24/2021)    Active Problems:    PEA (Pulseless electrical activity) ([facility] [address] (7/24/2021)      Cardiogenic shock ([facility] [address] (7/24/2021)         Plan:     Eric Anne is a 49 y.o. male admitted for PEA arrest with CPR in progress when brought in by EMS. Initial EKG after return of spontaneous circulation revealed a wide complex rhythm, likely accelerated idioventricular rhythm without any diagnostic ST elevation. At this point the patient had already required multiple rounds of epinephrine and sodium bicarbonate, and suspicion was for massive pulmonary embolism. The patient was brought to CT scan which was reportedly of suboptimal quality, but without any diagnostic pulmonary embolism: \"IMPRESSION     1.  Limitations as above. No large pulmonary embolus is identified.     2. Extensive bilateral diffuse airspace disease. Differential diagnosis  includes aspiration, hemorrhage, or multifocal pneumonia\"    The patient was unstable, and shortly after return from CTA became pulseless again requiring intermittent CPR. Patient was placed on an epinephrine drip, as well as Levophed drip.   As CTA was negative for pulmonary embolism, and feeling like the next most likely cause of the sudden cardiac arrest was myocardial infarction, when we were finally able to get an adequate femoral pulse, emergent cardiac catheterization plus or minus Impella plus or minus PCI was discussed with the patient's family who expressed understanding and wished to proceed. The patient was brought emergently to the Cath Lab, and lost his pulse several times prior to draping requiring more CPR, and escalating doses of pressors. When we were finally able to maintain a femoral pulse for greater than 1 minute, we proceeded with plan for emergent Impella placement, with plan for subsequent coronary angiography and PCI if a culprit lesion was found. Initial femoral arterial blood pressure was approximately 60 systolic, which was felt to be incompatible with maintaining adequate cerebral perfusion, so CPR/ ACLS was resumed as we initiated the steps to place an Impella. .  At this point the patient had been coding for greater than 1 hour and ICU nurse practitioner Galileo Olivarez explained the patient's extremely grave condition and the likelihood that the patient could die no matter what we did. At that point, the patient's family requested that we cease resuscitative efforts. Just after femoral venous access and then femoral arterial access with Impella sheath placement and crossing into the ventricle with a Adam right 4 catheter, and after family had made this decision, the patient went into ventricular fibrillation with no pulse. Resuscitative efforts had gone on for well over 1 hour and at that point the patient's pupils were fixed and dilated, and we honored the family's request not to proceed with any more resuscitative efforts. Coronary angiography was not able to be obtained prior to the patient's passing. After the patient passed, I came to speak with the family in the CCU waiting room and explained the events, and expressed condolences. They had already been in contact with the . They expressed gratitude for our efforts.   The option of autopsy was discussed with the family, and they declined autopsy. The patient was transferred into the CCU for family viewing under the care of CCU nurse Mari Nesbitt. Greater than 75 minutes of critical care time were spent at the bedside exclusive of procedures.

## 2021-07-24 NOTE — PROGRESS NOTES
Spiritual Care Assessment/Progress Note  [facility]      NAME: Twylla Lennox      MRN: [id number]  AGE: 49 y.o. SEX: male  Mu-ism Affiliation:    Language: English     7/24/2021     Total Time (in minutes): 59     Spiritual Assessment begun in [facility] EMERGENCY DEPT through conversation with:         [x]Patient        [x] Family    [] Friend(s)        Reason for Consult: Emergency Department visit, Family care, Code Blue/99, Crisis     Spiritual beliefs: (Please include comment if needed)     [] Identifies with a patrick tradition:         [] Supported by a patrick community:            [] Claims no spiritual orientation:           [] Seeking spiritual identity:                [] Adheres to an individual form of spirituality:           [x] Not able to assess:                           Identified resources for coping:      [] Prayer                               [] Music                  [] Guided Imagery     [x] Family/friends                 [] Pet visits     [] Devotional reading                         [] Unknown     [] Other:                                               Interventions offered during this visit: (See comments for more details)    Patient Interventions: Crisis     Family/Friend(s):  Affirmation of emotions/emotional suffering, Initial Assessment, Normalization of emotional/spiritual concerns, Coping skills reviewed/reinforced, Catharsis/review of pertinent events in supportive environment     Plan of Care:     [] Support spiritual and/or cultural needs    [] Support AMD and/or advance care planning process      [] Support grieving process   [] Coordinate Rites and/or Rituals    [] Coordination with community clergy   [] No spiritual needs identified at this time   [] Detailed Plan of Care below (See Comments)  [] Make referral to Music Therapy  [] Make referral to Pet Therapy     [] Make referral to Addiction services  [] Make referral to [facility]  [] Make referral to Spiritual Care Partner  [] No future visits requested        [x] Follow up upon further referrals     Comments:      responded to ER  [employer]'s page informing . Hussain Modi in ER 20 has cardiac arrest. Upon arrival, patient's brother [place], his son Perfecto Rosales were present in the waiting room. They already saw the doctor, was told the prognosis of Mr. Hussain Modi. Later,  met larger family, with sister in law, ex-wife and another brothers and sisters.  escorted them to CCU waiting room, provided blnkets and emotional support. While their waiting, patient had Code Blue in Cath Lab. Ongoing care is in provision. Later,  informed them patient will be transferred to [address] [place] [address], debriefed with RN how family is doing and  support availability. Family said they are good. If further needs arise or major change happens, please page .      Fawn Valentine M.Div., M.S., Th.M.  Spiritual Care Provider   Paging Service [phone])

## 2021-07-27 LAB
CA-I BLD-MCNC: 1.19 MMOL/L (ref 1.12–1.32)
CHLORIDE BLD-SCNC: 107 MMOL/L (ref 100–108)
CREAT UR-MCNC: 1.2 MG/DL (ref 0.6–1.3)
GLUCOSE BLD STRIP.AUTO-MCNC: 398 MG/DL (ref 74–106)
LACTATE BLD-SCNC: 12.8 MMOL/L (ref 0.4–2)
PCO2 BLD: >110 MMHG (ref 35–45)
PH BLD: 6.82 [PH] (ref 7.35–7.45)
PO2 BLD: 20 MMHG (ref 80–100)
POTASSIUM BLD-SCNC: 4.2 MMOL/L (ref 3.5–5.5)
SODIUM BLD-SCNC: 146 MMOL/L (ref 136–145)

## 2023-07-13 NOTE — Clinical Note
Code Med given: 1mg Epi ([id number]).  No pulse Pt verbalized understanding of discharge information, and packet given. All questions answered. Pt  ambulated with steady gait out of department. All belongings sent home with pt. Pt discharged in stable condition, VSS.

## (undated) DEVICE — RADIFOCUS OPTITORQUE ANGIOGRAPHIC CATHETER: Brand: OPTITORQUE

## (undated) DEVICE — CATHETER ETER CARD MULTIPAK MULTIPAK 5FR PERFORMA

## (undated) DEVICE — TUBING PRSS MON L6IN PVC M FEM CONN

## (undated) DEVICE — SYR ART 700 CLEAR MARK 7 -- ARTERION

## (undated) DEVICE — PACK PROCEDURE SURG HRT CATH

## (undated) DEVICE — SUTURE PERMAHAND SZ 2-0 L18IN NONABSORBABLE BLK L26MM PS 1588H

## (undated) DEVICE — GLIDESHEATH SLENDER ACCESS KIT: Brand: GLIDESHEATH SLENDER

## (undated) DEVICE — CATH GUID COR EB35 6FR 100CM -- LAUNCHER

## (undated) DEVICE — GUIDEWIRE VASC L260CM 0.035IN J TIP L3MM PTFE FIX COR NAMIC

## (undated) DEVICE — CUSTOM KT PTCA INFL DEV K05 00053H

## (undated) DEVICE — KIT ANGIOGRAPHY CUST MRMC

## (undated) DEVICE — 3M™ TEGADERM™ TRANSPARENT FILM DRESSING FRAME STYLE, 1626W, 4 IN X 4-3/4 IN (10 CM X 12 CM), 50/CT 4CT/CASE: Brand: 3M™ TEGADERM™

## (undated) DEVICE — TR BAND RADIAL ARTERY COMPRESSION DEVICE: Brand: TR BAND

## (undated) DEVICE — CATHETER ETER ANGIO L110CM OD5FR ID046IN L75CM 038IN 145DEG CARD

## (undated) DEVICE — HI-TORQUE VERSACORE FLOPPY GUIDE WIRE SYSTEM 145 CM: Brand: HI-TORQUE VERSACORE

## (undated) DEVICE — PINNACLE INTRODUCER SHEATH: Brand: PINNACLE

## (undated) DEVICE — GUIDEWIRE VASC L145CM 0.035IN J TIP L3MM PTFE FIX COR NAMIC

## (undated) DEVICE — SPLINT WR POS F/ARTERIAL ACC -- BX/10

## (undated) DEVICE — PUMP HEART IMPELLA CP03 --

## (undated) DEVICE — KIT ACCS INTRO 4FR L10CM NDL 21GA L7CM GWIRE L40CM

## (undated) DEVICE — DRAPE PRB US TRNSDCR 6X96IN --

## (undated) DEVICE — SYRINGE ANGIO 10 CC BRL STD PRNT POLYCARB LT BLU MEDALLION